# Patient Record
Sex: FEMALE | Race: WHITE | NOT HISPANIC OR LATINO | URBAN - NONMETROPOLITAN AREA
[De-identification: names, ages, dates, MRNs, and addresses within clinical notes are randomized per-mention and may not be internally consistent; named-entity substitution may affect disease eponyms.]

---

## 2023-08-15 ENCOUNTER — OFFICE (OUTPATIENT)
Dept: URBAN - NONMETROPOLITAN AREA CLINIC 1 | Facility: CLINIC | Age: 58
End: 2023-08-15
Payer: COMMERCIAL

## 2023-08-15 VITALS
DIASTOLIC BLOOD PRESSURE: 71 MMHG | WEIGHT: 120 LBS | SYSTOLIC BLOOD PRESSURE: 117 MMHG | HEIGHT: 61 IN | HEART RATE: 85 BPM

## 2023-08-15 DIAGNOSIS — R13.10 DYSPHAGIA, UNSPECIFIED: ICD-10-CM

## 2023-08-15 DIAGNOSIS — R76.8 OTHER SPECIFIED ABNORMAL IMMUNOLOGICAL FINDINGS IN SERUM: ICD-10-CM

## 2023-08-15 DIAGNOSIS — Z86.010 PERSONAL HISTORY OF COLONIC POLYPS: ICD-10-CM

## 2023-08-15 DIAGNOSIS — K21.9 GASTRO-ESOPHAGEAL REFLUX DISEASE WITHOUT ESOPHAGITIS: ICD-10-CM

## 2023-08-15 DIAGNOSIS — K76.9 LIVER DISEASE, UNSPECIFIED: ICD-10-CM

## 2023-08-15 PROCEDURE — 36415 COLL VENOUS BLD VENIPUNCTURE: CPT | Performed by: NURSE PRACTITIONER

## 2023-08-15 PROCEDURE — 99204 OFFICE O/P NEW MOD 45 MIN: CPT | Performed by: NURSE PRACTITIONER

## 2023-08-15 RX ORDER — PANTOPRAZOLE 40 MG/1
TABLET, DELAYED RELEASE ORAL
Qty: 60 | Status: COMPLETED
End: 2023-08-15

## 2023-08-15 RX ORDER — FAMOTIDINE 40 MG/1
TABLET, FILM COATED ORAL
Qty: 30 | Refills: 3 | Status: ACTIVE
Start: 2023-08-15

## 2023-08-15 RX ORDER — POLYETHYLENE GLYCOL-3350 AND ELECTROLYTES WITH FLAVOR PACK 240; 5.84; 2.98; 6.72; 22.72 G/278.26G; G/278.26G; G/278.26G; G/278.26G; G/278.26G
POWDER, FOR SOLUTION ORAL
Qty: 4000 | Refills: 0 | Status: ACTIVE
Start: 2023-08-15

## 2023-08-15 NOTE — SERVICEHPINOTES
Pleasant 57-year-old female presents to establish care for reported autoimmune hepatitis.  She states that she had some elevated liver enzymes in the past and saw Dr. Cochran previously at the [facility]. She was to have had a liver biopsy, but she declined. br
br  most recent labs in January revealed normal liver enzymes, alkaline phosphatase 60, AST 28, ALT 21, total bilirubin 0.2, Lipid panel with high triglycerides (283), increaed total cholesterol 299, . 
br May 2022: CMP normalbr 
January 2022: AST 39, ALT 25, AP 39br   3/2021: liver enzymes normal, LOLI negative, smooth muscle AB negative, mitochondrial AB positive at 52.6, ferritin normal at 21.
br 
08/2020: normal liver enzymesbr 01/2020: AMA positive at 62.8, negative smooth muscle AB, negative LOLI
br 12/2019, mild elevation to AST at 41, ALT normal at 35, Alk phos normal at 74
br 03/2019: AST 38, ALT 59, AP: 63, TB 0.2 
br 
Hep C negative 2022br
br
She admits to chronic GERD for which she has to take pantoprazole 40 mg every day. If she misses more than 1 day, she has severe acid reflux.  Reports difficulty swallowing.  She is concerned regarding her esophagus, it appears that she was seen in consultation in 2021 by Dr. Lala after she had a CTA performed by her cardiologist which revealed some " swelling to the esophagus".  States that she did not have a follow-up endoscopy after this. 
br
br
She is in need of repeat colon cancer screening due to personal history of colon polyps.  Her bowels are moving daily.  No bright red rectal bleeding or melena.

## 2023-08-17 LAB
GGT: 108 IU/L — HIGH (ref 0–60)
HEPATIC FUNCTION PANEL (7): ALBUMIN: 4.8 G/DL (ref 3.8–4.9)
HEPATIC FUNCTION PANEL (7): ALKALINE PHOSPHATASE: 63 IU/L (ref 44–121)
HEPATIC FUNCTION PANEL (7): ALT (SGPT): 23 IU/L (ref 0–32)
HEPATIC FUNCTION PANEL (7): AST (SGOT): 21 IU/L (ref 0–40)
HEPATIC FUNCTION PANEL (7): BILIRUBIN, DIRECT: <0.1 MG/DL
HEPATIC FUNCTION PANEL (7): BILIRUBIN, TOTAL: 0.2 MG/DL (ref 0–1.2)
HEPATIC FUNCTION PANEL (7): PROTEIN, TOTAL: 6.5 G/DL (ref 6–8.5)
MITOCHONDRIAL (M2) ANTIBODY: 68.5 UNITS — HIGH (ref 0–20)
RHEUMATOID FACTOR (RF): <10 IU/ML

## 2023-12-05 ENCOUNTER — ON CAMPUS - OUTPATIENT (OUTPATIENT)
Dept: URBAN - NONMETROPOLITAN AREA HOSPITAL 34 | Facility: HOSPITAL | Age: 58
End: 2023-12-05

## 2023-12-05 DIAGNOSIS — K31.89 OTHER DISEASES OF STOMACH AND DUODENUM: ICD-10-CM

## 2023-12-05 DIAGNOSIS — D12.2 BENIGN NEOPLASM OF ASCENDING COLON: ICD-10-CM

## 2023-12-05 DIAGNOSIS — Z09 ENCOUNTER FOR FOLLOW-UP EXAMINATION AFTER COMPLETED TREATMEN: ICD-10-CM

## 2023-12-05 DIAGNOSIS — Z86.010 PERSONAL HISTORY OF COLONIC POLYPS: ICD-10-CM

## 2023-12-05 PROCEDURE — 43239 EGD BIOPSY SINGLE/MULTIPLE: CPT | Mod: 51 | Performed by: INTERNAL MEDICINE

## 2023-12-05 PROCEDURE — 45385 COLONOSCOPY W/LESION REMOVAL: CPT | Performed by: INTERNAL MEDICINE

## 2024-02-12 ENCOUNTER — OFFICE (OUTPATIENT)
Dept: URBAN - NONMETROPOLITAN AREA CLINIC 1 | Facility: CLINIC | Age: 59
End: 2024-02-12
Payer: COMMERCIAL

## 2024-02-12 ENCOUNTER — OFFICE (OUTPATIENT)
Dept: URBAN - NONMETROPOLITAN AREA CLINIC 1 | Facility: CLINIC | Age: 59
End: 2024-02-12

## 2024-02-12 VITALS
SYSTOLIC BLOOD PRESSURE: 108 MMHG | WEIGHT: 121 LBS | HEART RATE: 81 BPM | HEIGHT: 61 IN | DIASTOLIC BLOOD PRESSURE: 65 MMHG

## 2024-02-12 VITALS — HEIGHT: 61 IN

## 2024-02-12 DIAGNOSIS — K74.00 HEPATIC FIBROSIS, UNSPECIFIED: ICD-10-CM

## 2024-02-12 DIAGNOSIS — R76.8 OTHER SPECIFIED ABNORMAL IMMUNOLOGICAL FINDINGS IN SERUM: ICD-10-CM

## 2024-02-12 DIAGNOSIS — Z86.010 PERSONAL HISTORY OF COLONIC POLYPS: ICD-10-CM

## 2024-02-12 DIAGNOSIS — K76.9 LIVER DISEASE, UNSPECIFIED: ICD-10-CM

## 2024-02-12 PROCEDURE — 76981 USE PARENCHYMA: CPT | Performed by: NURSE PRACTITIONER

## 2024-02-12 PROCEDURE — 99214 OFFICE O/P EST MOD 30 MIN: CPT | Performed by: NURSE PRACTITIONER

## 2024-02-12 PROCEDURE — 36415 COLL VENOUS BLD VENIPUNCTURE: CPT | Performed by: NURSE PRACTITIONER

## 2024-02-13 LAB
GGT: 82 IU/L — HIGH (ref 0–60)
HEPATIC FUNCTION PANEL (7): ALBUMIN: 4.3 G/DL (ref 3.8–4.9)
HEPATIC FUNCTION PANEL (7): ALKALINE PHOSPHATASE: 51 IU/L (ref 44–121)
HEPATIC FUNCTION PANEL (7): ALT (SGPT): 13 IU/L (ref 0–32)
HEPATIC FUNCTION PANEL (7): AST (SGOT): 14 IU/L (ref 0–40)
HEPATIC FUNCTION PANEL (7): BILIRUBIN, DIRECT: <0.1 MG/DL
HEPATIC FUNCTION PANEL (7): BILIRUBIN, TOTAL: <0.2 MG/DL
HEPATIC FUNCTION PANEL (7): PROTEIN, TOTAL: 6.1 G/DL (ref 6–8.5)

## 2024-05-03 ENCOUNTER — OFFICE (OUTPATIENT)
Dept: URBAN - NONMETROPOLITAN AREA CLINIC 1 | Facility: CLINIC | Age: 59
End: 2024-05-03

## 2024-05-03 VITALS
DIASTOLIC BLOOD PRESSURE: 71 MMHG | WEIGHT: 124 LBS | SYSTOLIC BLOOD PRESSURE: 100 MMHG | HEIGHT: 61 IN | HEART RATE: 81 BPM

## 2024-05-03 DIAGNOSIS — R10.11 RIGHT UPPER QUADRANT PAIN: ICD-10-CM

## 2024-05-03 DIAGNOSIS — Z87.19 PERSONAL HISTORY OF OTHER DISEASES OF THE DIGESTIVE SYSTEM: ICD-10-CM

## 2024-05-03 DIAGNOSIS — K59.00 CONSTIPATION, UNSPECIFIED: ICD-10-CM

## 2024-05-03 DIAGNOSIS — R76.8 OTHER SPECIFIED ABNORMAL IMMUNOLOGICAL FINDINGS IN SERUM: ICD-10-CM

## 2024-05-03 DIAGNOSIS — R74.8 ABNORMAL LEVELS OF OTHER SERUM ENZYMES: ICD-10-CM

## 2024-05-03 DIAGNOSIS — Z90.49 ACQUIRED ABSENCE OF OTHER SPECIFIED PARTS OF DIGESTIVE TRACT: ICD-10-CM

## 2024-05-03 PROCEDURE — 99214 OFFICE O/P EST MOD 30 MIN: CPT | Performed by: NURSE PRACTITIONER

## 2024-05-03 RX ORDER — LINACLOTIDE 72 UG/1
CAPSULE, GELATIN COATED ORAL
Qty: 90 | Refills: 1 | Status: COMPLETED
Start: 2024-05-03 | End: 2024-07-23

## 2024-06-06 ENCOUNTER — OFFICE (OUTPATIENT)
Dept: URBAN - NONMETROPOLITAN AREA CLINIC 1 | Facility: CLINIC | Age: 59
End: 2024-06-06

## 2024-06-06 VITALS
HEART RATE: 84 BPM | DIASTOLIC BLOOD PRESSURE: 63 MMHG | WEIGHT: 123 LBS | HEIGHT: 61 IN | SYSTOLIC BLOOD PRESSURE: 92 MMHG

## 2024-06-06 DIAGNOSIS — K83.8 OTHER SPECIFIED DISEASES OF BILIARY TRACT: ICD-10-CM

## 2024-06-06 DIAGNOSIS — Z87.19 PERSONAL HISTORY OF OTHER DISEASES OF THE DIGESTIVE SYSTEM: ICD-10-CM

## 2024-06-06 DIAGNOSIS — R76.8 OTHER SPECIFIED ABNORMAL IMMUNOLOGICAL FINDINGS IN SERUM: ICD-10-CM

## 2024-06-06 DIAGNOSIS — R10.11 RIGHT UPPER QUADRANT PAIN: ICD-10-CM

## 2024-06-06 PROCEDURE — 99214 OFFICE O/P EST MOD 30 MIN: CPT | Performed by: NURSE PRACTITIONER

## 2024-06-06 PROCEDURE — 36415 COLL VENOUS BLD VENIPUNCTURE: CPT | Performed by: NURSE PRACTITIONER
